# Patient Record
(demographics unavailable — no encounter records)

---
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
## 2025-07-11 NOTE — IMAGING
[de-identified] : LSPINE Palpation: No tenderness to palpation or spasm in bilateral thoracic and lumbar paraspinal musculature, LEFT SI joint tenderness to palpation ROM: Full with stiffness Strength: 5/5 bilateral hip flexors, knee extensors, ankle dorsiflexors, EHL, ankle plantarflexors Sensation: Sensation present to light touch bilateral L2-S1 distributions Provocative maneuvers: Negative bilateral straight leg raise   LEFT Hip- Palpation: Tenderness to palpation over greater trochanter and IT band ROM: No groin pain with flexion and internal rotation

## 2025-07-11 NOTE — ASSESSMENT
[FreeTextEntry1] : [facility] PT rx   NSAIDs- Patient warned of risk of medication to GI tract, increased blood pressure, cardiac risk, and risk of fluid retention.  Advised to clear medication with internist or PCP if any concurrent health problem with heart, blood pressure, or GI system exists.   Greater Trochanteric Bursitis- The risks, benefits, contents and alternatives to injection were explained in full to the patient.  Risks outlined include but are not limited to infection, bleeding, scarring, skin discoloration, temporary increase in pain, syncopal episode, failure to resolve symptoms, allergic reaction, flare reaction, permanent white skin discoloration, symptom recurrence, and elevation of blood sugar in diabetics.  Patient understood the risks.  All questions were answered.  After discussion of options, patient requested an injection.  Oral informed consent was obtained and the injection site was prepped in a sterile fashion. The mixture consisted of:   4 cc 1% lidocaine, 80 mg of Depomedrol x1   The right  trochanteric bursa was palpated to determine the site of maximal tenderness overlying the greater trochanter. The needle was advanced to contact the greater trochanter. The needle was withdrawn 1-2 mm and after negative aspiration, medication was injected.  The patient tolerated the procedure without any complications.